# Patient Record
Sex: FEMALE | Race: BLACK OR AFRICAN AMERICAN | NOT HISPANIC OR LATINO | Employment: PART TIME | URBAN - METROPOLITAN AREA
[De-identification: names, ages, dates, MRNs, and addresses within clinical notes are randomized per-mention and may not be internally consistent; named-entity substitution may affect disease eponyms.]

---

## 2021-01-09 ENCOUNTER — OUTSIDE PLACE OF SERVICE (OUTPATIENT)
Dept: ADMINISTRATIVE | Facility: OTHER | Age: 61
End: 2021-01-09
Payer: MEDICAID

## 2021-01-09 PROCEDURE — 99221 PR INITIAL HOSPITAL CARE,LEVL I: ICD-10-PCS | Mod: ,,, | Performed by: THORACIC SURGERY (CARDIOTHORACIC VASCULAR SURGERY)

## 2021-01-09 PROCEDURE — 99221 1ST HOSP IP/OBS SF/LOW 40: CPT | Mod: ,,, | Performed by: THORACIC SURGERY (CARDIOTHORACIC VASCULAR SURGERY)

## 2021-01-10 ENCOUNTER — OUTSIDE PLACE OF SERVICE (OUTPATIENT)
Dept: ADMINISTRATIVE | Facility: OTHER | Age: 61
End: 2021-01-10
Payer: MEDICAID

## 2021-01-10 PROCEDURE — 99231 SBSQ HOSP IP/OBS SF/LOW 25: CPT | Mod: ,,, | Performed by: NURSE PRACTITIONER

## 2021-01-10 PROCEDURE — 99231 PR SUBSEQUENT HOSPITAL CARE,LEVL I: ICD-10-PCS | Mod: ,,, | Performed by: NURSE PRACTITIONER

## 2021-12-15 ENCOUNTER — HOSPITAL ENCOUNTER (OUTPATIENT)
Dept: RADIOLOGY | Facility: HOSPITAL | Age: 61
Discharge: HOME | End: 2021-12-15
Attending: PHYSICIAN ASSISTANT
Payer: MEDICAID

## 2021-12-15 ENCOUNTER — OFFICE VISIT (OUTPATIENT)
Dept: ORTHOPEDICS | Facility: CLINIC | Age: 61
End: 2021-12-15
Payer: MEDICAID

## 2021-12-15 VITALS
SYSTOLIC BLOOD PRESSURE: 144 MMHG | DIASTOLIC BLOOD PRESSURE: 84 MMHG | HEIGHT: 64 IN | BODY MASS INDEX: 36.81 KG/M2 | WEIGHT: 215.63 LBS | HEART RATE: 83 BPM

## 2021-12-15 DIAGNOSIS — M77.11 LATERAL EPICONDYLITIS OF RIGHT ELBOW: Primary | ICD-10-CM

## 2021-12-15 DIAGNOSIS — M77.11 LATERAL EPICONDYLITIS OF RIGHT ELBOW: ICD-10-CM

## 2021-12-15 PROCEDURE — 73080 X-RAY EXAM OF ELBOW: CPT | Mod: TC,PO,RT

## 2021-12-15 PROCEDURE — 99214 OFFICE O/P EST MOD 30 MIN: CPT | Mod: PBBFAC,PN | Performed by: PHYSICIAN ASSISTANT

## 2021-12-15 PROCEDURE — 73080 X-RAY EXAM OF ELBOW: CPT | Mod: 26,RT,, | Performed by: RADIOLOGY

## 2021-12-15 PROCEDURE — 4010F PR ACE/ARB THEARPY RXD/TAKEN: ICD-10-PCS | Mod: CPTII,,, | Performed by: PHYSICIAN ASSISTANT

## 2021-12-15 PROCEDURE — 99203 PR OFFICE/OUTPT VISIT, NEW, LEVL III, 30-44 MIN: ICD-10-PCS | Mod: S$PBB,,, | Performed by: PHYSICIAN ASSISTANT

## 2021-12-15 PROCEDURE — 99999 PR PBB SHADOW E&M-EST. PATIENT-LVL IV: CPT | Mod: PBBFAC,,, | Performed by: PHYSICIAN ASSISTANT

## 2021-12-15 PROCEDURE — 4010F ACE/ARB THERAPY RXD/TAKEN: CPT | Mod: CPTII,,, | Performed by: PHYSICIAN ASSISTANT

## 2021-12-15 PROCEDURE — 73080 XR ELBOW COMPLETE 3 VIEW RIGHT: ICD-10-PCS | Mod: 26,RT,, | Performed by: RADIOLOGY

## 2021-12-15 PROCEDURE — 99999 PR PBB SHADOW E&M-EST. PATIENT-LVL IV: ICD-10-PCS | Mod: PBBFAC,,, | Performed by: PHYSICIAN ASSISTANT

## 2021-12-15 PROCEDURE — 99203 OFFICE O/P NEW LOW 30 MIN: CPT | Mod: S$PBB,,, | Performed by: PHYSICIAN ASSISTANT

## 2021-12-15 RX ORDER — METOCLOPRAMIDE 5 MG/1
TABLET ORAL
COMMUNITY
Start: 2021-01-12

## 2021-12-15 RX ORDER — LANCETS
1 EACH MISCELLANEOUS
COMMUNITY
Start: 2021-09-22

## 2021-12-15 RX ORDER — METHOTREXATE 2.5 MG/1
TABLET ORAL
COMMUNITY
Start: 2021-02-25

## 2021-12-15 RX ORDER — ASPIRIN 81 MG/1
81 TABLET ORAL
COMMUNITY
Start: 2021-02-13

## 2021-12-15 RX ORDER — MELOXICAM 15 MG/1
15 TABLET ORAL DAILY
Qty: 21 TABLET | Refills: 0 | Status: SHIPPED | OUTPATIENT
Start: 2021-12-15 | End: 2022-01-31 | Stop reason: SDUPTHER

## 2021-12-15 RX ORDER — ATENOLOL 25 MG/1
12.5 TABLET ORAL
COMMUNITY
Start: 2021-06-14

## 2021-12-15 RX ORDER — ROSUVASTATIN CALCIUM 20 MG/1
20 TABLET, COATED ORAL
COMMUNITY
Start: 2021-01-05

## 2021-12-15 RX ORDER — FERROUS SULFATE 325(65) MG
325 TABLET ORAL
COMMUNITY

## 2021-12-15 RX ORDER — LISINOPRIL AND HYDROCHLOROTHIAZIDE 12.5; 2 MG/1; MG/1
1 TABLET ORAL DAILY
COMMUNITY
Start: 2020-12-28

## 2021-12-15 RX ORDER — GLIPIZIDE 5 MG/1
1 TABLET ORAL 2 TIMES DAILY
COMMUNITY
Start: 2021-04-12

## 2021-12-15 RX ORDER — FOLIC ACID 1 MG/1
1000 TABLET ORAL
COMMUNITY
Start: 2021-04-02

## 2021-12-15 RX ORDER — TOBRAMYCIN AND DEXAMETHASONE 3; 1 MG/ML; MG/ML
1 SUSPENSION/ DROPS OPHTHALMIC
COMMUNITY
Start: 2021-08-26

## 2021-12-15 RX ORDER — BLOOD SUGAR DIAGNOSTIC
STRIP MISCELLANEOUS 3 TIMES DAILY
COMMUNITY
Start: 2021-10-23

## 2021-12-15 RX ORDER — METFORMIN HYDROCHLORIDE 500 MG/1
1 TABLET ORAL 2 TIMES DAILY
COMMUNITY
Start: 2021-04-12

## 2021-12-15 RX ORDER — NITROGLYCERIN 0.4 MG/1
0.4 TABLET SUBLINGUAL
COMMUNITY
Start: 2020-12-28

## 2021-12-15 RX ORDER — HYDROCHLOROTHIAZIDE 25 MG/1
TABLET ORAL
COMMUNITY
Start: 2021-11-01

## 2021-12-15 RX ORDER — MISOPROSTOL 200 UG/1
TABLET ORAL
COMMUNITY
Start: 2021-01-12

## 2021-12-15 RX ORDER — INDOMETHACIN 25 MG/1
25 CAPSULE ORAL
COMMUNITY
Start: 2020-12-21 | End: 2021-12-15

## 2021-12-15 RX ORDER — GABAPENTIN 300 MG/1
300 CAPSULE ORAL
COMMUNITY
Start: 2021-09-22

## 2021-12-20 ENCOUNTER — CLINICAL SUPPORT (OUTPATIENT)
Dept: REHABILITATION | Facility: HOSPITAL | Age: 61
End: 2021-12-20
Attending: PHYSICIAN ASSISTANT
Payer: MEDICAID

## 2021-12-20 DIAGNOSIS — M77.11 LATERAL EPICONDYLITIS OF RIGHT ELBOW: ICD-10-CM

## 2021-12-20 DIAGNOSIS — M25.631 STIFFNESS OF WRIST JOINT, RIGHT: ICD-10-CM

## 2021-12-20 DIAGNOSIS — M25.521 PAIN IN RIGHT ELBOW: ICD-10-CM

## 2021-12-20 DIAGNOSIS — R29.898 RIGHT HAND WEAKNESS: ICD-10-CM

## 2021-12-20 DIAGNOSIS — Z74.09 IMPAIRED MOBILITY AND ADLS: ICD-10-CM

## 2021-12-20 DIAGNOSIS — Z78.9 IMPAIRED MOBILITY AND ADLS: ICD-10-CM

## 2021-12-20 DIAGNOSIS — M25.621 STIFFNESS OF RIGHT ELBOW JOINT: ICD-10-CM

## 2021-12-20 PROCEDURE — 97166 OT EVAL MOD COMPLEX 45 MIN: CPT | Mod: PO

## 2021-12-20 PROCEDURE — 97110 THERAPEUTIC EXERCISES: CPT | Mod: PO

## 2021-12-28 ENCOUNTER — CLINICAL SUPPORT (OUTPATIENT)
Dept: REHABILITATION | Facility: HOSPITAL | Age: 61
End: 2021-12-28
Attending: PHYSICIAN ASSISTANT
Payer: MEDICAID

## 2021-12-28 DIAGNOSIS — M25.631 STIFFNESS OF WRIST JOINT, RIGHT: ICD-10-CM

## 2021-12-28 DIAGNOSIS — Z78.9 IMPAIRED MOBILITY AND ADLS: ICD-10-CM

## 2021-12-28 DIAGNOSIS — M25.521 PAIN IN RIGHT ELBOW: Primary | ICD-10-CM

## 2021-12-28 DIAGNOSIS — M25.621 STIFFNESS OF RIGHT ELBOW JOINT: ICD-10-CM

## 2021-12-28 DIAGNOSIS — R29.898 RIGHT HAND WEAKNESS: ICD-10-CM

## 2021-12-28 DIAGNOSIS — Z74.09 IMPAIRED MOBILITY AND ADLS: ICD-10-CM

## 2021-12-28 PROCEDURE — 97035 APP MDLTY 1+ULTRASOUND EA 15: CPT | Mod: PO

## 2021-12-28 PROCEDURE — 97110 THERAPEUTIC EXERCISES: CPT | Mod: PO

## 2021-12-29 ENCOUNTER — CLINICAL SUPPORT (OUTPATIENT)
Dept: REHABILITATION | Facility: HOSPITAL | Age: 61
End: 2021-12-29
Attending: PHYSICIAN ASSISTANT
Payer: MEDICAID

## 2021-12-29 DIAGNOSIS — M25.631 STIFFNESS OF WRIST JOINT, RIGHT: ICD-10-CM

## 2021-12-29 DIAGNOSIS — R29.898 RIGHT HAND WEAKNESS: ICD-10-CM

## 2021-12-29 DIAGNOSIS — Z74.09 IMPAIRED MOBILITY AND ADLS: ICD-10-CM

## 2021-12-29 DIAGNOSIS — M25.521 PAIN IN RIGHT ELBOW: Primary | ICD-10-CM

## 2021-12-29 DIAGNOSIS — M25.621 STIFFNESS OF RIGHT ELBOW JOINT: ICD-10-CM

## 2021-12-29 DIAGNOSIS — Z78.9 IMPAIRED MOBILITY AND ADLS: ICD-10-CM

## 2021-12-29 PROCEDURE — 97110 THERAPEUTIC EXERCISES: CPT | Mod: PO

## 2021-12-29 PROCEDURE — 97140 MANUAL THERAPY 1/> REGIONS: CPT | Mod: PO

## 2022-01-03 NOTE — PROGRESS NOTES
Occupational Therapy Daily Treatment Note     Name: Michelle [facility] Number: [id number]    Therapy Diagnosis:   Encounter Diagnoses   Name Primary?    Pain in right elbow Yes    Right hand weakness     Stiffness of wrist joint, right     Stiffness of right elbow joint     Impaired mobility and ADLs      Physician: Juanito Ventura, EDMUNDO    Physician orders:  Patient has been experiencing lateral epicondylitis symptoms of the right elbow for approximately 2 months.     Duration:  4 weeks Frequency:  3 times per week     Please work on gentle stretching, ultrasound, electrostimulation, and any other therapeutic modalities for the right elbow.  Thanks!        Visit Date: 1/5/2022    Medical Diagnosis: M77.11 (ICD-10-CM) - Lateral epicondylitis of right elbow  Evaluation Date: 12/20/2021  Insurance Authorization period Expiration: 12/15/22  Plan of Care Expiration Period: 4 weeks = 1/16/2022     Visit # / Visits Authortized:  4 / 12 Pending  #No shows 0 / # Cancellations: 0    Time In: 0915  Time Out: 0955    Total Billable Time: 38 minutes    Surgical Procedure and Date: N/A  S/P date from Surgery: N/A     Precautions: Standard DM type II    Subjective     Pt reports: No new symptoms or complaints  she was compliant with HEP.   Response to previous treatment: Good  Functional change:  None reported today.  Pain:  Functional Pain Scale Rating 0-10:   0/10 now  0//10 at best  6/10 at worst  Location: R lateral elbow  Description: Aching, Burning, Sharp and Variable  Aggravating Factors: lifting  Easing Factors: rest, has used cold packs some    Objective   MEASUREMENTS  Last measurements from Eval unless otherwise noted below.       Range of Motion:     1/05/2021  AROM R elbow ext = 0 degrees vs 0 degrees on L (Flexion is WNL)     12/29/2021  AROM wrist ext = 65 degrees vs WNL 70 degrees     AROM wrist flexion = 70 degrees vs WNL 80 degrees                           Manual Muscle Test: NT      strength  position II  25#       TREATMENT  Michelle received the following supervised modalities after being cleared for contradictions for 6 minutes: -Moist heat 3 min elbow and 3 min forearm pre.    Michelle received the following direct contact modalities after being cleared for contraindications for 0 minutes:  - Ultrasound continuous, 3Mhz, at 0.7 W/cm2 to R lateral elbow. (NT)    Michelle received the following manual therapy techniques for 8 minutes:   - STM to R elbow, ECRB and ECRL and trigger point.    Michelle received therapeutic exercises for 20 minutes including:  - 10x10 sec holds in ext with 2#DB  - 10x10 sec holds R elbow ext with 2# Tbar  - Wrist wheel x20 forward and x20 back.  - 3x10 hand exerciser with forearm in supination   - Prayer stretch 2x20 sec  - 3x10 rows with 2# Tbar     Therapeutic activities : 10 min  - Flexbar palm up/palm down, wrist flexion, wrist extension and unilateral pronation 3x10 each with red..       Home Exercises and Education Provided     Education provided:   -  Cont per previous.    Written Home Exercises Provided:  Patient instructed to cont prior HEP.    Exercises were reviewed and Michelle was able to demonstrate them prior to the end of the session.  Michelle demonstrated good  understanding of the education provided.     See EMR under Media for exercises provided today and/or prior visit.        Assessment     Pt would continue to benefit from skilled OT. Pt with some decreased pain reported today. Pt with WNL R elbow ext post tx today. Pt tolerated progression with tx well this date Cont per current poc.     - Progress towards goals:  STG #1 has been met.    Michelle is progressing well towards her goals . Pt continues with good rehab potential.     Pt will continue to benefit from skilled outpatient occupational therapy to address the deficits listed in the problem list on initial evaluation in order to maximize pt's level of independence in the home and community.     Anticipated barriers to  occupational therapy: None    Pt's spiritual, cultural and educational needs considered and pt agreeable to plan of care and goals.    Goals:  Short Term Goals: (4 weeks) 1/16/2022 (unless otherwise noted below)  1. Pt will be independent with HEP in 2 visits. MET  2. Pt will report decreased pain to a 5-6/10 at worst.  3. Pt will progress to WNL AROM R wrist flexion and ext.    4. Pt will demo increased R hand  stength of at least 10#      Long Term Goals: (by discharge)  1. Pt will report decreased pain to 1-2/10 with ADLs.   2. Pt will demo full elbow extension with pronation to retrieve objects from work space and shelving without pain.   3. Pt will exhibit a significant increase (20 points) in the Quick DASH assessment.  4. Pt will be independent with self management of future exacerbations.   5. Pt will increase max  strength to 40# on R.  6. Pt will return to PLOF with all AD:/IADL and work tasks.     Plan   Continue Occupational Therapy 3 times per week through current poc expiration date of 1/16/2022, in order to to decrease pain and edema, and increase A/PROM, strength, and functional use of R upper extremity.    Updates/Grading for next session:  Progress with OT as tolerated.      ANGÉLICA Mcclelland, TONYT

## 2022-01-05 ENCOUNTER — CLINICAL SUPPORT (OUTPATIENT)
Dept: REHABILITATION | Facility: HOSPITAL | Age: 62
End: 2022-01-05
Attending: PHYSICIAN ASSISTANT
Payer: MEDICAID

## 2022-01-05 DIAGNOSIS — M25.621 STIFFNESS OF RIGHT ELBOW JOINT: ICD-10-CM

## 2022-01-05 DIAGNOSIS — Z78.9 IMPAIRED MOBILITY AND ADLS: ICD-10-CM

## 2022-01-05 DIAGNOSIS — Z74.09 IMPAIRED MOBILITY AND ADLS: ICD-10-CM

## 2022-01-05 DIAGNOSIS — R29.898 RIGHT HAND WEAKNESS: ICD-10-CM

## 2022-01-05 DIAGNOSIS — M25.631 STIFFNESS OF WRIST JOINT, RIGHT: ICD-10-CM

## 2022-01-05 DIAGNOSIS — M25.521 PAIN IN RIGHT ELBOW: Primary | ICD-10-CM

## 2022-01-05 PROCEDURE — 97530 THERAPEUTIC ACTIVITIES: CPT | Mod: PO

## 2022-01-05 PROCEDURE — 97140 MANUAL THERAPY 1/> REGIONS: CPT | Mod: PO

## 2022-01-05 PROCEDURE — 97110 THERAPEUTIC EXERCISES: CPT | Mod: PO

## 2022-01-11 ENCOUNTER — CLINICAL SUPPORT (OUTPATIENT)
Dept: REHABILITATION | Facility: HOSPITAL | Age: 62
End: 2022-01-11
Attending: PHYSICIAN ASSISTANT
Payer: MEDICAID

## 2022-01-11 DIAGNOSIS — M25.521 PAIN IN RIGHT ELBOW: Primary | ICD-10-CM

## 2022-01-11 DIAGNOSIS — Z74.09 IMPAIRED MOBILITY AND ADLS: ICD-10-CM

## 2022-01-11 DIAGNOSIS — M25.621 STIFFNESS OF RIGHT ELBOW JOINT: ICD-10-CM

## 2022-01-11 DIAGNOSIS — M25.631 STIFFNESS OF WRIST JOINT, RIGHT: ICD-10-CM

## 2022-01-11 DIAGNOSIS — Z78.9 IMPAIRED MOBILITY AND ADLS: ICD-10-CM

## 2022-01-11 DIAGNOSIS — R29.898 RIGHT HAND WEAKNESS: ICD-10-CM

## 2022-01-11 PROCEDURE — 97110 THERAPEUTIC EXERCISES: CPT | Mod: PO

## 2022-01-11 NOTE — PROGRESS NOTES
Occupational Therapy Daily Treatment Note     Name: Michelle [facility] Number: [id number]    Therapy Diagnosis:   Encounter Diagnoses   Name Primary?    Pain in right elbow Yes    Right hand weakness     Stiffness of wrist joint, right     Stiffness of right elbow joint     Impaired mobility and ADLs      Physician: Juanito Ventura, EDMUNDO    Physician orders:  Patient has been experiencing lateral epicondylitis symptoms of the right elbow for approximately 2 months.     Duration:  4 weeks Frequency:  3 times per week     Please work on gentle stretching, ultrasound, electrostimulation, and any other therapeutic modalities for the right elbow.  Thanks!        Visit Date: 1/11/2022    Medical Diagnosis: M77.11 (ICD-10-CM) - Lateral epicondylitis of right elbow  Evaluation Date: 12/20/2021  Insurance Authorization period Expiration: 12/15/22  Plan of Care Expiration Period: 4 weeks = 1/16/2022     Visit # / Visits Authortized:  5 / 12 Pending  #No shows 0 / # Cancellations: 0    Time In: 0845  Time Out: 0920   Total Billable Time: 30 minutes    Surgical Procedure and Date: N/A  S/P date from Surgery: N/A     Precautions: Standard DM type II    Subjective     Pt reports: No new symptoms or complaints  she was compliant with HEP.   Response to previous treatment: Good  Functional change:  None reported today.  Pain:  Functional Pain Scale Rating 0-10:   0/10 now  0//10 at best  5-6/10 at worst  Location: R lateral elbow  Description: Aching, Burning, Sharp and Variable  Aggravating Factors: lifting  Easing Factors: rest, has used cold packs some    Objective   MEASUREMENTS  Last measurements from Eval unless otherwise noted below.       Range of Motion:     1/05/2021  AROM R elbow ext = 0 degrees vs 0 degrees on L (Flexion is WNL)     12/29/2021  AROM wrist ext = 65 degrees vs WNL 70 degrees     AROM wrist flexion = 70 degrees vs WNL 80 degrees                           Manual Muscle Test: NT      strength  position II  25#       TREATMENT  Michelle received the following supervised modalities after being cleared for contradictions for 6 minutes: -Moist heat 5 min elbow/forearm pre.    Michelle received the following direct contact modalities after being cleared for contraindications for 0 minutes:  - Ultrasound continuous, 3Mhz, at 0.7 W/cm2 to R lateral elbow. (NT)    Michelle received the following manual therapy techniques for 3 minutes:   - STM to R elbow, ECRB and ECRL and trigger point.    Michelle received therapeutic exercises for 23 minutes including:  - Overhead pulleys 3 min wit 3 sec holds.  - Wrist wheel x20 forward and x20 back.  - 3x10 hand exerciser with forearm in supination   - Prayer stretch 2x20 sec  - 3x30 sec AAROM elbow flexion and ext with 2# Tbar     Therapeutic activities : 4 min  - Flexbar palm up/palm down, wrist flexion, unilateral pronation 3x10 each with green       Home Exercises and Education Provided     Education provided:   -  Cont per previous.    Written Home Exercises Provided:  Patient instructed to cont prior HEP.    Exercises were reviewed and Michelle was able to demonstrate them prior to the end of the session.  Michelle demonstrated good  understanding of the education provided.     See EMR under Media for exercises provided today and/or prior visit.        Assessment     Pt would continue to benefit from skilled OT. Pt with some decreased pain reported today.Pt tolerated progression with tx well this date Cont per current poc.     - Progress towards goals:  STG #1 has been met.    Michelle is progressing well towards her goals . Pt continues with good rehab potential.     Pt will continue to benefit from skilled outpatient occupational therapy to address the deficits listed in the problem list on initial evaluation in order to maximize pt's level of independence in the home and community.     Anticipated barriers to occupational therapy: None    Pt's spiritual, cultural and educational needs  considered and pt agreeable to plan of care and goals.    Goals:  Short Term Goals: (4 weeks) 1/16/2022 (unless otherwise noted below)  1. Pt will be independent with HEP in 2 visits. MET  2. Pt will report decreased pain to a 5-6/10 at worst.  3. Pt will progress to WNL AROM R wrist flexion and ext.    4. Pt will demo increased R hand  stength of at least 10#      Long Term Goals: (by discharge)  1. Pt will report decreased pain to 1-2/10 with ADLs.   2. Pt will demo full elbow extension with pronation to retrieve objects from work space and shelving without pain.   3. Pt will exhibit a significant increase (20 points) in the Quick DASH assessment.  4. Pt will be independent with self management of future exacerbations.   5. Pt will increase max  strength to 40# on R.  6. Pt will return to PLOF with all AD:/IADL and work tasks.     Plan   Continue Occupational Therapy 3 times per week through current poc expiration date of 1/16/2022, in order to to decrease pain and edema, and increase A/PROM, strength, and functional use of R upper extremity.    Updates/Grading for next session:  Progress with OT as tolerated.      ANGÉLICA Mcclelland, TONYT

## 2022-01-31 ENCOUNTER — OFFICE VISIT (OUTPATIENT)
Dept: ORTHOPEDICS | Facility: CLINIC | Age: 62
End: 2022-01-31
Payer: MEDICAID

## 2022-01-31 VITALS
DIASTOLIC BLOOD PRESSURE: 65 MMHG | HEART RATE: 67 BPM | SYSTOLIC BLOOD PRESSURE: 135 MMHG | HEIGHT: 64 IN | WEIGHT: 215.63 LBS | BODY MASS INDEX: 36.81 KG/M2

## 2022-01-31 DIAGNOSIS — M77.11 LATERAL EPICONDYLITIS OF RIGHT ELBOW: Primary | ICD-10-CM

## 2022-01-31 PROCEDURE — 3008F BODY MASS INDEX DOCD: CPT | Mod: CPTII,,, | Performed by: PHYSICIAN ASSISTANT

## 2022-01-31 PROCEDURE — 3075F SYST BP GE 130 - 139MM HG: CPT | Mod: CPTII,,, | Performed by: PHYSICIAN ASSISTANT

## 2022-01-31 PROCEDURE — 3078F PR MOST RECENT DIASTOLIC BLOOD PRESSURE < 80 MM HG: ICD-10-PCS | Mod: CPTII,,, | Performed by: PHYSICIAN ASSISTANT

## 2022-01-31 PROCEDURE — 3078F DIAST BP <80 MM HG: CPT | Mod: CPTII,,, | Performed by: PHYSICIAN ASSISTANT

## 2022-01-31 PROCEDURE — 3008F PR BODY MASS INDEX (BMI) DOCUMENTED: ICD-10-PCS | Mod: CPTII,,, | Performed by: PHYSICIAN ASSISTANT

## 2022-01-31 PROCEDURE — 99999 PR PBB SHADOW E&M-EST. PATIENT-LVL IV: ICD-10-PCS | Mod: PBBFAC,,, | Performed by: PHYSICIAN ASSISTANT

## 2022-01-31 PROCEDURE — 99214 OFFICE O/P EST MOD 30 MIN: CPT | Mod: PBBFAC,PN | Performed by: PHYSICIAN ASSISTANT

## 2022-01-31 PROCEDURE — 1160F PR REVIEW ALL MEDS BY PRESCRIBER/CLIN PHARMACIST DOCUMENTED: ICD-10-PCS | Mod: CPTII,,, | Performed by: PHYSICIAN ASSISTANT

## 2022-01-31 PROCEDURE — 1160F RVW MEDS BY RX/DR IN RCRD: CPT | Mod: CPTII,,, | Performed by: PHYSICIAN ASSISTANT

## 2022-01-31 PROCEDURE — 99999 PR PBB SHADOW E&M-EST. PATIENT-LVL IV: CPT | Mod: PBBFAC,,, | Performed by: PHYSICIAN ASSISTANT

## 2022-01-31 PROCEDURE — 3075F PR MOST RECENT SYSTOLIC BLOOD PRESS GE 130-139MM HG: ICD-10-PCS | Mod: CPTII,,, | Performed by: PHYSICIAN ASSISTANT

## 2022-01-31 PROCEDURE — 1159F MED LIST DOCD IN RCRD: CPT | Mod: CPTII,,, | Performed by: PHYSICIAN ASSISTANT

## 2022-01-31 PROCEDURE — 99213 OFFICE O/P EST LOW 20 MIN: CPT | Mod: S$PBB,,, | Performed by: PHYSICIAN ASSISTANT

## 2022-01-31 PROCEDURE — 99213 PR OFFICE/OUTPT VISIT, EST, LEVL III, 20-29 MIN: ICD-10-PCS | Mod: S$PBB,,, | Performed by: PHYSICIAN ASSISTANT

## 2022-01-31 PROCEDURE — 1159F PR MEDICATION LIST DOCUMENTED IN MEDICAL RECORD: ICD-10-PCS | Mod: CPTII,,, | Performed by: PHYSICIAN ASSISTANT

## 2022-01-31 RX ORDER — MELOXICAM 15 MG/1
15 TABLET ORAL DAILY
Qty: 21 TABLET | Refills: 0 | Status: SHIPPED | OUTPATIENT
Start: 2022-01-31

## 2022-01-31 NOTE — PROGRESS NOTES
1/31/2022    HPI:  Michelle Shea is a 61 y.o. female, who presents to clinic today for evaluation of her lateral epicondylitis of the right elbow.  States she has taken the Mobic, performed physical therapy, and perform splinting as instructed.  States her lateral epicondylitis symptoms have significantly improved since her last visit.  States the combination of physical therapy, anti-inflammatories, and splinting have significantly improved her pain.  States pain is currently 4/10.  Denies any other complaints at this time.    PMHX:  Past Medical History:   Diagnosis Date    Diabetes mellitus, type 2     High cholesterol     Hypertension        PSHX:  History reviewed. No pertinent surgical history.    FMHX:  History reviewed. No pertinent family history.    SOCHX:  Social History     Tobacco Use    Smoking status: Former Smoker    Smokeless tobacco: Never Used   Substance Use Topics    Alcohol use: Not Currently       ALLERGIES:  Patient has no known allergies.    CURRENT MEDICATIONS:  Current Outpatient Medications on File Prior to Visit   Medication Sig Dispense Refill    aspirin (ECOTRIN) 81 MG EC tablet Take 81 mg by mouth.      atenoloL (TENORMIN) 25 MG tablet Take 12.5 mg by mouth.      ferrous sulfate (FEOSOL) 325 mg (65 mg iron) Tab tablet Take 325 mg by mouth.      folic acid (FOLVITE) 1 MG tablet Take 1,000 mcg by mouth.      gabapentin (NEURONTIN) 300 MG capsule Take 300 mg by mouth.      glipiZIDE (GLUCOTROL) 5 MG tablet Take 1 tablet by mouth 2 (two) times daily.      hydroCHLOROthiazide (HYDRODIURIL) 25 MG tablet TAKE 1 TABLET BY MOUTH ON MONDAY, WEDNESDAY AND FRIDAY only      lancets Misc 1 application.      lisinopriL-hydrochlorothiazide (PRINZIDE,ZESTORETIC) 20-12.5 mg per tablet Take 1 tablet by mouth once daily.      metFORMIN (GLUCOPHAGE) 500 MG tablet Take 1 tablet by mouth 2 (two) times daily.      methotrexate 2.5 MG Tab TAKE SIX TABLETS BY MOUTH EVERY 7 DAYS       "metoclopramide HCl (REGLAN) 5 MG tablet TAKE 1 TABLET BY MOUTH 4 TIMES DAILY (BEFORE MEALS and NIGHTLY)      miSOPROStoL (CYTOTEC) 200 MCG Tab TAKE 1 TABLET (200 mcg) BY MOUTH 3 TIMES DAILY      nitroGLYCERIN (NITROSTAT) 0.4 MG SL tablet Place 0.4 mg under the tongue.      ONETOUCH ULTRA TEST Strp 3 (three) times daily.      rosuvastatin (CRESTOR) 20 MG tablet Take 20 mg by mouth.      SITagliptin (JANUVIA) 100 MG Tab Take 1 tablet by mouth once daily.      tobramycin-dexamethasone 0.3-0.1% (TOBRADEX) 0.3-0.1 % DrpS Apply 1 drop to eye.      [DISCONTINUED] meloxicam (MOBIC) 15 MG tablet Take 1 tablet (15 mg total) by mouth once daily. 21 tablet 0     No current facility-administered medications on file prior to visit.       REVIEW OF SYSTEMS:  Review of Systems Complete; Negative, unless noted above.    GENERAL PHYSICAL EXAM:   /65   Pulse 67   Ht 5' 4" (1.626 m)   Wt 97.8 kg (215 lb 9.8 oz)   BMI 37.01 kg/m²    GEN: well developed, well nourished, no acute distress   PULM: No wheezing, no respiratory distress   CV: RRR    ORTHO EXAM:   Examination of the right elbow reveals no edema, erythema, ecchymosis, or skin breakdown.  Mild to moderate tenderness present over the lateral epicondyle over the area of the ECRB tendon.  Mildly positive middle finger extension test.  Mildly positive resisted wrist extension test.  Normal sensation in the radial, ulnar, and median nerve distributions.  Capillary refill less than 2 seconds.    RADIOLOGY:   None.    ASSESSMENT:   Lateral epicondylitis of the right elbow    PLAN:  1. I discussed with Michelle Shea that she has progressed well in the treatment course.  We discussed that considering she has had significant improvement in her lateral epicondylitis symptoms with splinting, physical therapy, and anti-inflammatories that it would be appropriate to continue these conservative measures to improve her lateral epicondylitis symptoms.  We discussed for any " worsening of her symptoms or if she ever decides she would like to pursue other treatment options we can perform a steroid injection as an alternative treatment for her lateral epicondylitis symptoms.  She verbally agreed with the treatment plan.    2. I have refilled her prescription of Mobic in clinic today.  We discussed that this would be the last refill I provided her, and for any further refills she would need to go through her primary care provider.  She verbalized understanding.      3. I would like to have her follow up in clinic on a p.r.n. basis for any worsening of her symptoms or for any hand, wrist, or elbow problems/concerns.  She was instructed to contact the clinic or reappoint for any problems/concerns in the interim.